# Patient Record
(demographics unavailable — no encounter records)

---

## 2024-12-09 NOTE — REVIEW OF SYSTEMS
[Change in Activity] : no change in activity [Fever] : no fever [Wgt Loss (___ Lbs)] : no recent weight loss [Eye Discharge] : no eye discharge [Redness] : no redness [Swollen Eyelids] : no swollen eyelids [Change in Vision] : no change in vision  [Nasal Stuffiness] : no nasal congestion [Sore Throat] : no sore throat [Earache] : no earache [Nosebleeds] : no epistaxis [Cyanosis] : no cyanosis [Edema] : no edema [Diaphoresis] : not diaphoretic [Exercise Intolerance] : no persistence of exercise intolerance [Chest Pain] : no chest pain or discomfort [Palpitations] : no palpitations [Tachypnea] : not tachypneic [Wheezing] : no wheezing [Cough] : no cough [Shortness of Breath] : no shortness of breath [Change in Appetite] : no change in appetite [Vomiting] : no vomiting [Diarrhea] : no diarrhea [Abdominal Pain] : no abdominal pain [Constipation] : no constipation [Fainting (Syncope)] : no fainting [Seizure] : no seizures [Headache] : no headache [Dizziness] : no dizziness [Limping] : no limping [Joint Pains] : no arthralgias [Joint Swelling] : no joint swelling [Back Pain] : ~T no back pain [Muscle Aches] : no muscle aches [Rash] : no rash [Insect Bites] : no insect bites [Skin Lesions] : no skin lesions [Bruising] : no tendency for easy bruising [Swollen Glands] : no lymphadenopathy [Sleep Disturbances] : ~T no sleep disturbances [Hyperactive] : no hyperactive behavior [Emotional Problems] : no ~T emotional problems [Change In Personality] : ~T no personality change [Dec Urine Output] : no oliguria [Urinary Frequency] : no change in urinary frequency [Pain During Urination (Dysuria)] : no dysuria [Vaginal Discharge] : no vaginal discharge [Pubertal Concerns] : no pubertal concerns [Delayed Menarche] : no delayed menarche [Irregular Periods] : irregular periods

## 2024-12-09 NOTE — HISTORY OF PRESENT ILLNESS
[New - [facility] Care] : a new patient visit to establish care [FreeTextEntry6] : 14 year old female for evaluation of periods.   Menarche: 12 years old. LMP: 11/20-11/28,  11/6. In October had period twice as well. Missed period in August but usually gets it monthly. Started getting it twice a monthly in September. Periods tend to last ~ week. Changes pad 5 times on heaviest days which tends to be two days of her cycle. Mild dysmenorrhea that resolves on it's own. Has been getting intermittent right sided pain for years. Has not had imaging. + acne. No hirsutism.   No easy bleeding. Feels she bruises easily.   Was seen by Endocrinologist due to weight gain 1 year ago and was told labs were normal.   PMHx: UTI age 5 years old PSHX: none Family Hx: mom with HTN, mom with hx of irregular periods, no family hx of bleeding disorder  H: lives with parents, 25 year old brother, 22 year old sister E: 9th grade, going well A: watches shows, uses phone  D: varied diet S: never SA S: rates mood 7/10

## 2024-12-09 NOTE — DISCUSSION/SUMMARY
[FreeTextEntry1] : 14 year old female with AUB. Had regular monthly periods until missed period in August and then periods twice a month. Will send bleeding workup and evaluate for androgen excess. Discussed irregular periods may be due to HPO immaturity but will evaluate these other causes. No pain on exam but will get ultrasound given history of right sided pain. Will barak with labs results and determine next steps.

## 2024-12-09 NOTE — PHYSICAL EXAM
[General Appearance - Well Developed] : interactive [General Appearance - Well-Appearing] : well appearing [General Appearance - In No Acute Distress] : in no acute distress [Appearance Of Head] : the head was normocephalic [Sclera] : the sclera and conjunctiva were normal [PERRL With Normal Accommodation] : pupils were equal in size, round, reactive to light, with normal accommodation [Extraocular Movements] : extraocular movements were intact [Outer Ear] : the ears and nose were normal in appearance [Nasal Cavity] : the nasal mucosa and septum were normal [Examination Of The Oral Cavity] : the teeth, gums, and palate were normal [Oropharynx] : the oropharynx was normal  [Neck Cervical Mass (___cm)] : no neck mass was observed [Respiration, Rhythm And Depth] : normal respiratory rhythm and effort [Auscultation Breath Sounds / Voice Sounds] : clear bilateral breath sounds [Heart Rate And Rhythm] : heart rate and rhythm were normal [Heart Sounds] : normal S1 and S2 [Murmurs] : no murmurs [Bowel Sounds] : normal bowel sounds [Abdomen Soft] : soft [Abdomen Tenderness] : non-tender [Abdominal Distention] : nondistended [Musculoskeletal Exam: Normal Movement Of All Extremities] : normal movements of all extremities [Motor Tone] : muscle strength and tone were normal [FreeTextEntry1] : grossly intact [Skin Color & Pigmentation] : normal skin color and pigmentation [] : no significant rash [Skin Lesions] : no skin lesions

## 2024-12-09 NOTE — HISTORY OF PRESENT ILLNESS
[New - [facility] Care] : a new patient visit to establish care [FreeTextEntry6] : 14 year old female for evaluation of periods.   Menarche: 12 years old. LMP: 11/20-11/28,  11/6. In October had period twice as well. Missed period in August but usually gets it monthly. Started getting it twice a monthly in September. Periods tend to last ~ week. Changes pad 5 times on heaviest days which tends to be two days of her cycle. Mild dysmenorrhea that resolves on it's own. Has been getting intermittent right sided pain for years. Has not had imaging. + acne. No hirsutism.   No easy bleeding. Feels she bruises easily.   Was seen by Endocrinologist due to weight gain 1 year ago and was told labs were normal.   PMHx: UTI age 5 years old PSHX: none Family Hx: mom with HTN, mom with hx of irregular periods, no family hx of bleeding disorder  H: lives with parents, 25 year old brother, 22 year old sister E: 9th grade, going well A: watches shows, uses phone  D: varied diet S: never SA S: rates mood 7/10

## 2025-02-16 NOTE — HISTORY OF PRESENT ILLNESS
[FreeTextEntry6] : 14 year old female with AUB for f/u.  Menarche 12 years old. Periods regular until October when she got it twice and had it twice in November. Had regular period in December that lasted 6 days. In January had period on 1/6-1/19 and then again 1/29-2/12. Bleeding was light and changed pad 4-5 times per day. Saw PMD on 2/8 and Hgb 12.1. No dizziness, fatigue.   Patient and mother now interested in OCP to help regular periods. Workup done in December was negative for bleeding disorder and did show androgen excess. Had hemorrhagic cyst on u/s in December which was repeated and u/s was normal.   No migraines, headaches, lupus, gallbladder history for patient. No family history of blood clot.   No tobacco/substance use. Never SA. Mood is good.

## 2025-02-16 NOTE — DISCUSSION/SUMMARY
[FreeTextEntry1] : 14 year old female with AUB, normal heme labs, and androgen excess (low SHBG, elevated free testosterone). Recently with prolonged periods. No longer bleeding and normal Hgb. Discussed continuing to monitor or using OCP to regulate periods. Patient and mom would like to try OCP. No medical contraindications to estrogen. Will start sprintec. Urine pregnancy negative. Counseled on common/severe side effects, reasons to see MD. Discussed dietary changes and f/u with RD if interested. RTC 4-6 weeks or sooner if any new concerns.

## 2025-04-02 NOTE — DISCUSSION/SUMMARY
[FreeTextEntry1] : 14 year old female with AUB, normal heme labs, and androgen excess (low SHBG, elevated free testosterone). Periods managed on OCP but with increased headache frequency. No aura but given change in headaches, will switch to Slynd to see if better tolerated. Counseled on common/severe side effects, reasons to see MD. RTC 4-6 weeks or sooner if any new concerns.

## 2025-04-02 NOTE — HISTORY OF PRESENT ILLNESS
[FreeTextEntry6] : 14 year old female with irregular periods and androgen excess for OCP surveillance.  LMP: 3/18. Lasted 7 days. Bleeding was lighter than when not on the pill. Some dysmenorrhea unchanged from when she wasn't on OCP. No med needed.   Missed pill 2-3 times in the last pack. In second week of second pack. ACHES negative other than having more frequent headaches. Now having headaches 3-4 days per week. No aura. No nausea, vomiting, or dizziness.

## 2025-05-28 NOTE — DISCUSSION/SUMMARY
[FreeTextEntry1] : 15 year old female with AUB, normal heme labs, and androgen excess (low SHBG, elevated free testosterone). Doing well on Slynd. Still with some headaches so recommend seeing PMD but not frequent or long lasting. To work on having mom check that she took pill to prevent missed pills. Discussed side effect of missed pills is BTB but if used for pregnancy prevention, then missed pills will make OCP less effective. Counseled on common/severe side effects, reasons to see MD. RTC 6 months or sooner if any new concerns. Refill sent.

## 2025-05-28 NOTE — HISTORY OF PRESENT ILLNESS
[FreeTextEntry6] : 15 year old female with irregular periods and androgen excess for OCP surveillance.   Switched to Slynd. Having headache once a week but doesn't last as long. No nausea or vomiting. Had daily headaches on sprintec. ACHES otherwise negative.  LMP: 5/18/25. Lasted 5 days. Mild dysmenorrhea and improved from when she wasn't on OCP. Bleeding was lighter.   Forgot to take pill three times in past pack.  Never SA. Mood is good. Feels acne is improved.